# Patient Record
(demographics unavailable — no encounter records)

---

## 2025-02-19 NOTE — DISCUSSION/SUMMARY
[FreeTextEntry1] : BW for fatigue ua,urine cx if pos Cefadroxil 500 mg po bid x 10 days Albuterol inhaler prior to sports

## 2025-02-19 NOTE — REVIEW OF SYSTEMS
[Fever] : fever [Malaise] : malaise [Change in Weight] : no change in weight [Chest Pain] : no chest pain [Intolerance to Exercise] : intolerance to exercise [Cough] : no cough [Dysuria] : no dysuria [Polyuria] : polyuria [Hematuria] : no hematuria [Penile Tenderness] : no penile tenderness [Negative] : Skin

## 2025-02-19 NOTE — HISTORY OF PRESENT ILLNESS
[de-identified] : per mom always tired X few months, gets enough sleep at night per mom but is still tired during the day, afebrile, no recent illnesses [FreeTextEntry6] : Lately he seems to be very tired.  He has been going to school fine but very tired during his sports activities.  he plays soccer and sometimes finds that he is a little breathless with his running activities.  Mom has a hx of anemia and there is a fam hx of asthma. No cough or fevers.  Also he finds that after he urinates he has to go again pretty soon .  No dysuria or hematuria.  No constipation.